# Patient Record
Sex: MALE | Race: WHITE | URBAN - METROPOLITAN AREA
[De-identification: names, ages, dates, MRNs, and addresses within clinical notes are randomized per-mention and may not be internally consistent; named-entity substitution may affect disease eponyms.]

---

## 2017-10-23 ENCOUNTER — CLINICAL SUPPORT (OUTPATIENT)
Dept: CARDIOLOGY CLINIC | Age: 32
End: 2017-10-23

## 2017-10-23 DIAGNOSIS — Z02.89 EXAMINATION, PHYSICAL, EMPLOYEE: Primary | ICD-10-CM

## 2017-10-23 NOTE — PROGRESS NOTES
See scanned report. Anusha RASMUSSEN ordered study and Dr. Maribel Tang read study. ID verified per protocol. Test and risks explained. Consent signed after all patient questions answered. Patient developed fatigue during test. At 2 minutes in recovery, patient without symptoms or complaints voiced.

## 2025-04-15 ENCOUNTER — TELEPHONE (OUTPATIENT)
Age: 40
End: 2025-04-15

## 2025-04-15 NOTE — TELEPHONE ENCOUNTER
Attempted to call patient, call went to voicemail. Unable to leave message due to voicemail being full.     Reached out to patient to see if he had any recent imaging done?

## 2025-04-16 ENCOUNTER — OFFICE VISIT (OUTPATIENT)
Age: 40
End: 2025-04-16
Payer: COMMERCIAL

## 2025-04-16 VITALS
DIASTOLIC BLOOD PRESSURE: 86 MMHG | TEMPERATURE: 97.6 F | OXYGEN SATURATION: 99 % | WEIGHT: 189.2 LBS | HEIGHT: 66 IN | SYSTOLIC BLOOD PRESSURE: 129 MMHG | BODY MASS INDEX: 30.41 KG/M2

## 2025-04-16 DIAGNOSIS — R19.00 INTRAABDOMINAL MASS: Primary | ICD-10-CM

## 2025-04-16 PROCEDURE — 99203 OFFICE O/P NEW LOW 30 MIN: CPT | Performed by: SURGERY

## 2025-04-16 RX ORDER — CLONAZEPAM 0.5 MG/1
0.5 TABLET ORAL NIGHTLY PRN
COMMUNITY

## 2025-04-16 RX ORDER — ROSUVASTATIN CALCIUM 20 MG/1
20 TABLET, COATED ORAL DAILY
COMMUNITY

## 2025-04-16 ASSESSMENT — PATIENT HEALTH QUESTIONNAIRE - PHQ9
SUM OF ALL RESPONSES TO PHQ QUESTIONS 1-9: 0
SUM OF ALL RESPONSES TO PHQ QUESTIONS 1-9: 0
2. FEELING DOWN, DEPRESSED OR HOPELESS: NOT AT ALL
1. LITTLE INTEREST OR PLEASURE IN DOING THINGS: NOT AT ALL
SUM OF ALL RESPONSES TO PHQ QUESTIONS 1-9: 0
SUM OF ALL RESPONSES TO PHQ QUESTIONS 1-9: 0

## 2025-04-16 NOTE — PROGRESS NOTES
Identified patient with two patient identifiers (name and ). Reviewed chart in preparation for visit and have obtained necessary documentation.    Nicanor Matta is a 39 y.o. male  Chief Complaint   Patient presents with    New Patient     /86   Temp 97.6 °F (36.4 °C)   Ht 1.664 m (5' 5.5\")   Wt 85.8 kg (189 lb 3.2 oz)   SpO2 99%   BMI 31.01 kg/m²     1. Have you been to the ER, urgent care clinic since your last visit?  Hospitalized since your last visit?no    2. Have you seen or consulted any other health care providers outside of the [facility] since your last visit?  Include any pap smears or colon screening. no  
Subjective:       Nicanor Matta presents to the clinic {numbers; 0-10:81146} weeks following ***. Eating a regular diet {WITH-WITHOUT:5700} difficulty. Bowel movements are {NORMAL / ABNORMAL (RESULT):10898::\"Normal\"}.  {PAIN CONTROL:79653::\"The patient is not having any pain.\"}.      Objective:      /86   Temp 97.6 °F (36.4 °C)   Ht 1.664 m (5' 5.5\")   Wt 85.8 kg (189 lb 3.2 oz)   SpO2 99%   BMI 31.01 kg/m²     General:  {gen appearance:56913::\"alert\",\"appears stated age\",\"cooperative\"}   Abdomen: {POST OP ABD EXAM:65314::\"soft\",\"bowel sounds active\",\"non-tender\"}   Incision:   {INCISION:86949::\"healing well\",\"no drainage\",\"no dehiscence\",\"no erythema\",\"no hernia\",\"no seroma\",\"incision well approximated\",\"no swelling\"}       Assessment:      {DOING WELL:84342::\"Doing well postoperatively.\"}      Plan:      1. Continue any current medications.  2. Wound care discussed.  3. {DISPOSITION:58093}  4. Follow up: {numbers; 0-10:20826} {units:11} for suture removal.   
large mass in the area of the gastric antrum pushing the transvers caolon laterally. Some calcifications present in it as well.  The MRI shows the same thing but also a 4 cm mass in the right lobe in segment 6.    Assessment and Plan:      Diagnosis Orders   1. Intraabdominal mass            He saw a surgeon near home who has a  PET scan scheduled for tomorrow, which I suggested he follow up on. Additionally his chromogranin level is not elevated.. I told him and his parents I would present his case at our tumor board tomorrow and call him with the recommendations. Did briefly discuss the possible options of chemo, surgery or radiation. Also said further diagnostic imaging and possible biopsy were in the offing.  Will call him tomorrow.      Signed By: Kel Blake MD     04/16/25

## 2025-04-17 ENCOUNTER — TELEPHONE (OUTPATIENT)
Age: 40
End: 2025-04-17

## 2025-04-23 ENCOUNTER — TELEPHONE (OUTPATIENT)
Age: 40
End: 2025-04-23

## 2025-04-23 NOTE — TELEPHONE ENCOUNTER
He told me he had the Dotatate scan on the 17th, and the lesions lit up but\"not as mucg as they expected.\"  We are going to try to get the images by any means .  Told him a biopsy is the next logical step to help determine the sequencing of surgery, Ondansetron, etc.  Will get back to him when I have seen the images.    Dr. Murillo is working on a biopsy locally as well.